# Patient Record
Sex: MALE | URBAN - METROPOLITAN AREA
[De-identification: names, ages, dates, MRNs, and addresses within clinical notes are randomized per-mention and may not be internally consistent; named-entity substitution may affect disease eponyms.]

---

## 2021-05-14 ENCOUNTER — OFFICE VISIT (OUTPATIENT)
Dept: URBAN - METROPOLITAN AREA CLINIC 58 | Facility: CLINIC | Age: 66
End: 2021-05-14
Payer: MEDICARE

## 2021-05-14 DIAGNOSIS — H53.2 DOUBLE VISION: Primary | ICD-10-CM

## 2021-05-14 PROCEDURE — 99204 OFFICE O/P NEW MOD 45 MIN: CPT | Performed by: OPTOMETRIST

## 2021-05-14 ASSESSMENT — INTRAOCULAR PRESSURE
OD: 10
OS: 11

## 2021-05-14 ASSESSMENT — KERATOMETRY
OS: 43.88
OD: 43.25

## 2021-05-14 ASSESSMENT — VISUAL ACUITY
OS: 20/20
OD: 20/20

## 2021-05-14 NOTE — IMPRESSION/PLAN
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 Plan: Discussed diagnosis in detail with patient. Advised patient of condition. Geovani Basket phorias show 1 diopter hypertropia OS. Forced eye opening negative for myesthenia. No weakness of upperlid on prolong elevation of gaze. No proptosis. Recommend a consultation with Neurologist for further evaluation. Patient already scheduled to see Neurologist.  Best guess is mini-strokes.

## 2022-11-21 ENCOUNTER — OFFICE VISIT (OUTPATIENT)
Dept: URBAN - METROPOLITAN AREA CLINIC 58 | Facility: CLINIC | Age: 67
End: 2022-11-21
Payer: MEDICARE

## 2022-11-21 DIAGNOSIS — H26.493 OTHER SECONDARY CATARACT, BILATERAL: Primary | ICD-10-CM

## 2022-11-21 PROCEDURE — 92014 COMPRE OPH EXAM EST PT 1/>: CPT | Performed by: OPTOMETRIST

## 2022-11-21 ASSESSMENT — INTRAOCULAR PRESSURE
OD: 12
OS: 11

## 2022-11-21 NOTE — IMPRESSION/PLAN
Impression: Other secondary cataract, bilateral: H26.493. Plan: Discussed diagnosis in detail with patient. No treatment is required at this time. Will continue to observe condition and or symptoms. Call if [address] worsens.